# Patient Record
Sex: FEMALE | Race: WHITE | Employment: FULL TIME | URBAN - METROPOLITAN AREA
[De-identification: names, ages, dates, MRNs, and addresses within clinical notes are randomized per-mention and may not be internally consistent; named-entity substitution may affect disease eponyms.]

---

## 2017-07-28 ENCOUNTER — HOSPITAL ENCOUNTER (EMERGENCY)
Age: 27
Discharge: HOME | End: 2017-07-29
Attending: EMERGENCY MEDICINE
Payer: SELF-PAY

## 2017-07-28 DIAGNOSIS — H66.92 LEFT OTITIS MEDIA, UNSPECIFIED CHRONICITY, UNSPECIFIED OTITIS MEDIA TYPE: Primary | ICD-10-CM

## 2017-07-28 PROCEDURE — 99283 EMERGENCY DEPT VISIT LOW MDM: CPT | Performed by: EMERGENCY MEDICINE

## 2017-07-29 VITALS
WEIGHT: 180 LBS | RESPIRATION RATE: 18 BRPM | TEMPERATURE: 98.6 F | DIASTOLIC BLOOD PRESSURE: 80 MMHG | BODY MASS INDEX: 31.89 KG/M2 | HEART RATE: 85 BPM | OXYGEN SATURATION: 100 % | HEIGHT: 63 IN | SYSTOLIC BLOOD PRESSURE: 149 MMHG

## 2017-07-29 PROCEDURE — 74011250637 HC RX REV CODE- 250/637: Performed by: EMERGENCY MEDICINE

## 2017-07-29 RX ORDER — NAPROXEN 250 MG/1
500 TABLET ORAL ONCE
Status: COMPLETED | OUTPATIENT
Start: 2017-07-29 | End: 2017-07-29

## 2017-07-29 RX ORDER — AMOXICILLIN 500 MG/1
500 CAPSULE ORAL
Status: COMPLETED | OUTPATIENT
Start: 2017-07-29 | End: 2017-07-29

## 2017-07-29 RX ORDER — AMOXICILLIN 875 MG/1
875 TABLET, FILM COATED ORAL 2 TIMES DAILY
Qty: 20 TAB | Refills: 0 | Status: SHIPPED | OUTPATIENT
Start: 2017-07-29 | End: 2018-08-23

## 2017-07-29 RX ORDER — TRAMADOL HYDROCHLORIDE 50 MG/1
100 TABLET ORAL
Qty: 19 TAB | Refills: 0 | Status: SHIPPED | OUTPATIENT
Start: 2017-07-29 | End: 2018-08-23

## 2017-07-29 RX ADMIN — NAPROXEN 500 MG: 250 TABLET ORAL at 00:53

## 2017-07-29 RX ADMIN — AMOXICILLIN 500 MG: 500 CAPSULE ORAL at 00:53

## 2017-07-29 NOTE — ED PROVIDER NOTES
Patient is a 23 y.o. female presenting with ear pain. The history is provided by the patient. Ear Pain    This is a new problem. The current episode started yesterday. The problem occurs constantly. Patient complains that the left ear is affected. There has been no fever. The pain is moderate. Associated symptoms include ear discharge. Pertinent negatives include no headaches, no rhinorrhea, no sore throat, no abdominal pain, no diarrhea, no vomiting, no neck pain, no cough and no rash. Her past medical history does not include chronic ear infection or hearing loss. History reviewed. No pertinent past medical history. Past Surgical History:   Procedure Laterality Date    HX GYN               History reviewed. No pertinent family history. Social History     Social History    Marital status: SINGLE     Spouse name: N/A    Number of children: N/A    Years of education: N/A     Occupational History    Not on file. Social History Main Topics    Smoking status: Current Every Day Smoker    Smokeless tobacco: Not on file    Alcohol use No    Drug use: No    Sexual activity: Not on file     Other Topics Concern    Not on file     Social History Narrative    No narrative on file         ALLERGIES: Review of patient's allergies indicates no known allergies. Review of Systems   Constitutional: Negative for chills and fever. HENT: Positive for ear discharge and ear pain. Negative for congestion, rhinorrhea and sore throat. Eyes: Negative for photophobia and discharge. Respiratory: Negative for cough and shortness of breath. Cardiovascular: Negative for chest pain and palpitations. Gastrointestinal: Negative for abdominal pain, constipation, diarrhea and vomiting. Endocrine: Negative for cold intolerance and heat intolerance. Genitourinary: Negative for dysuria and flank pain. Musculoskeletal: Negative for arthralgias, myalgias and neck pain.    Skin: Negative for rash and wound. Allergic/Immunologic: Negative for environmental allergies and food allergies. Neurological: Negative for syncope and headaches. Hematological: Negative for adenopathy. Does not bruise/bleed easily. Psychiatric/Behavioral: Negative for dysphoric mood. The patient is not nervous/anxious. All other systems reviewed and are negative. Vitals:    07/28/17 2311   BP: 155/81   Pulse: 90   Resp: 16   Temp: 99.4 °F (37.4 °C)   SpO2: 100%   Weight: 81.6 kg (180 lb)   Height: 5' 3\" (1.6 m)            Physical Exam   Constitutional: She is oriented to person, place, and time. She appears well-developed and well-nourished. She appears distressed. HENT:   Head: Normocephalic and atraumatic. Right Ear: Hearing normal.   Left Ear: Hearing and external ear normal. Tympanic membrane is injected and bulging. Mouth/Throat: Oropharynx is clear and moist.   Eyes: Conjunctivae and EOM are normal. Pupils are equal, round, and reactive to light. Right eye exhibits no discharge. Left eye exhibits no discharge. No scleral icterus. Neck: Normal range of motion. Neck supple. No thyromegaly present. Cardiovascular: Normal rate, regular rhythm, normal heart sounds and intact distal pulses. Exam reveals no gallop and no friction rub. No murmur heard. Pulmonary/Chest: Effort normal and breath sounds normal. No respiratory distress. She has no wheezes. She exhibits no tenderness. Abdominal: Soft. Bowel sounds are normal. She exhibits no distension. There is no hepatosplenomegaly. There is no tenderness. There is no rebound and no guarding. No hernia. Musculoskeletal: Normal range of motion. She exhibits no edema or tenderness. Neurological: She is alert and oriented to person, place, and time. No cranial nerve deficit. She exhibits normal muscle tone. Skin: Skin is warm. No rash noted. She is not diaphoretic. No erythema. Psychiatric: She has a normal mood and affect.  Her behavior is normal. Judgment and thought content normal.   Nursing note and vitals reviewed. MDM  Number of Diagnoses or Management Options  Left otitis media, unspecified chronicity, unspecified otitis media type: new and does not require workup  Diagnosis management comments: Otalgia versus infection versus perforation       Amount and/or Complexity of Data Reviewed  Tests in the medicine section of CPT®: ordered and reviewed  Review and summarize past medical records: yes    Risk of Complications, Morbidity, and/or Mortality  Presenting problems: moderate  Diagnostic procedures: low  Management options: moderate  General comments: Elements of this note have been dictated via voice recognition software. Text and phrases may be limited by the accuracy of the software. The chart has been reviewed, but errors may still be present.       Patient Progress  Patient progress: improved    ED Course       Procedures

## 2017-07-29 NOTE — DISCHARGE INSTRUCTIONS
Ear Infection (Otitis Media): Care Instructions  Your Care Instructions    An ear infection may start with a cold and affect the middle ear (otitis media). It can hurt a lot. Most ear infections clear up on their own in a couple of days. Most often you will not need antibiotics. This is because many ear infections are caused by a virus. Antibiotics don't work against a virus. Regular doses of pain medicines are the best way to reduce your fever and help you feel better. Follow-up care is a key part of your treatment and safety. Be sure to make and go to all appointments, and call your doctor if you are having problems. It's also a good idea to know your test results and keep a list of the medicines you take. How can you care for yourself at home? · Take pain medicines exactly as directed. ¨ If the doctor gave you a prescription medicine for pain, take it as prescribed. ¨ If you are not taking a prescription pain medicine, take an over-the-counter medicine, such as acetaminophen (Tylenol), ibuprofen (Advil, Motrin), or naproxen (Aleve). Read and follow all instructions on the label. ¨ Do not take two or more pain medicines at the same time unless the doctor told you to. Many pain medicines have acetaminophen, which is Tylenol. Too much acetaminophen (Tylenol) can be harmful. · Plan to take a full dose of pain reliever before bedtime. Getting enough sleep will help you get better. · Try a warm, moist washcloth on the ear. It may help relieve pain. · If your doctor prescribed antibiotics, take them as directed. Do not stop taking them just because you feel better. You need to take the full course of antibiotics. When should you call for help? Call your doctor now or seek immediate medical care if:  · You have new or increasing ear pain. · You have new or increasing pus or blood draining from your ear. · You have a fever with a stiff neck or a severe headache.   Watch closely for changes in your health, and be sure to contact your doctor if:  · You have new or worse symptoms. · You are not getting better after taking an antibiotic for 2 days. Where can you learn more? Go to http://gloria-randee.info/. Enter B512 in the search box to learn more about \"Ear Infection (Otitis Media): Care Instructions. \"  Current as of: May 4, 2017  Content Version: 11.3  © 5235-4444 [employer]. Care instructions adapted under license by [employer] (which disclaims liability or warranty for this information). If you have questions about a medical condition or this instruction, always ask your healthcare professional. [address] any warranty or liability for your use of this information.

## 2017-07-29 NOTE — ED TRIAGE NOTES
Pt arrives complaining of left ear pain. Pt states she went swimming last weekend and states she feels like theres something in her ear. Pt states liquid came out of it today. Pt alert and oriented in triage.

## 2018-08-23 ENCOUNTER — HOSPITAL ENCOUNTER (EMERGENCY)
Age: 28
Discharge: HOME | End: 2018-08-23
Attending: EMERGENCY MEDICINE
Payer: SELF-PAY

## 2018-08-23 VITALS
DIASTOLIC BLOOD PRESSURE: 65 MMHG | RESPIRATION RATE: 18 BRPM | WEIGHT: 172 LBS | OXYGEN SATURATION: 95 % | SYSTOLIC BLOOD PRESSURE: 145 MMHG | HEIGHT: 62 IN | TEMPERATURE: 98.1 F | HEART RATE: 95 BPM | BODY MASS INDEX: 31.65 KG/M2

## 2018-08-23 DIAGNOSIS — N30.01 ACUTE CYSTITIS WITH HEMATURIA: Primary | ICD-10-CM

## 2018-08-23 LAB
ALBUMIN SERPL-MCNC: 3.7 G/DL (ref 3.5–5)
ALBUMIN/GLOB SERPL: 1 {RATIO} (ref 1.2–3.5)
ALP SERPL-CCNC: 96 U/L (ref 50–136)
ALT SERPL-CCNC: 19 U/L (ref 12–65)
ANION GAP SERPL CALC-SCNC: 13 MMOL/L (ref 7–16)
AST SERPL-CCNC: 20 U/L (ref 15–37)
BACTERIA URNS QL MICRO: ABNORMAL /HPF
BASOPHILS # BLD: 0.1 K/UL (ref 0–0.2)
BASOPHILS NFR BLD: 1 % (ref 0–2)
BILIRUB SERPL-MCNC: 0.3 MG/DL (ref 0.2–1.1)
BUN SERPL-MCNC: 15 MG/DL (ref 6–23)
CALCIUM SERPL-MCNC: 9 MG/DL (ref 8.3–10.4)
CASTS URNS QL MICRO: 0 /LPF
CHLORIDE SERPL-SCNC: 107 MMOL/L (ref 98–107)
CO2 SERPL-SCNC: 22 MMOL/L (ref 21–32)
CREAT SERPL-MCNC: 0.79 MG/DL (ref 0.6–1)
CRYSTALS URNS QL MICRO: ABNORMAL /LPF
DIFFERENTIAL METHOD BLD: ABNORMAL
EOSINOPHIL # BLD: 0.7 K/UL (ref 0–0.8)
EOSINOPHIL NFR BLD: 4 % (ref 0.5–7.8)
EPI CELLS #/AREA URNS HPF: ABNORMAL /HPF
ERYTHROCYTE [DISTWIDTH] IN BLOOD BY AUTOMATED COUNT: 13.8 %
GLOBULIN SER CALC-MCNC: 3.7 G/DL (ref 2.3–3.5)
GLUCOSE SERPL-MCNC: 91 MG/DL (ref 65–100)
HCG UR QL: NEGATIVE
HCT VFR BLD AUTO: 39.2 % (ref 35.8–46.3)
HGB BLD-MCNC: 12.7 G/DL (ref 11.7–15.4)
IMM GRANULOCYTES # BLD: 0.1 K/UL (ref 0–0.5)
IMM GRANULOCYTES NFR BLD AUTO: 0 % (ref 0–5)
LYMPHOCYTES # BLD: 6.5 K/UL (ref 0.5–4.6)
LYMPHOCYTES NFR BLD: 41 % (ref 13–44)
MCH RBC QN AUTO: 29 PG (ref 26.1–32.9)
MCHC RBC AUTO-ENTMCNC: 32.4 G/DL (ref 31.4–35)
MCV RBC AUTO: 89.5 FL (ref 79.6–97.8)
MONOCYTES # BLD: 0.9 K/UL (ref 0.1–1.3)
MONOCYTES NFR BLD: 6 % (ref 4–12)
MUCOUS THREADS URNS QL MICRO: 0 /LPF
NEUTS SEG # BLD: 7.5 K/UL (ref 1.7–8.2)
NEUTS SEG NFR BLD: 47 % (ref 43–78)
NRBC # BLD: 0 K/UL (ref 0–0.2)
OTHER OBSERVATIONS,UCOM: ABNORMAL
PLATELET # BLD AUTO: 350 K/UL (ref 150–450)
PMV BLD AUTO: 9.6 FL (ref 9.4–12.3)
POTASSIUM SERPL-SCNC: 4.2 MMOL/L (ref 3.5–5.1)
PROT SERPL-MCNC: 7.4 G/DL (ref 6.3–8.2)
RBC # BLD AUTO: 4.38 M/UL (ref 4.05–5.2)
RBC #/AREA URNS HPF: ABNORMAL /HPF
SODIUM SERPL-SCNC: 142 MMOL/L (ref 136–145)
WBC # BLD AUTO: 15.8 K/UL (ref 4.3–11.1)
WBC URNS QL MICRO: ABNORMAL /HPF

## 2018-08-23 PROCEDURE — 80053 COMPREHEN METABOLIC PANEL: CPT

## 2018-08-23 PROCEDURE — 81003 URINALYSIS AUTO W/O SCOPE: CPT | Performed by: EMERGENCY MEDICINE

## 2018-08-23 PROCEDURE — 81015 MICROSCOPIC EXAM OF URINE: CPT

## 2018-08-23 PROCEDURE — 85025 COMPLETE CBC W/AUTO DIFF WBC: CPT

## 2018-08-23 PROCEDURE — 99284 EMERGENCY DEPT VISIT MOD MDM: CPT | Performed by: EMERGENCY MEDICINE

## 2018-08-23 PROCEDURE — 81025 URINE PREGNANCY TEST: CPT

## 2018-08-23 RX ORDER — NITROFURANTOIN 25; 75 MG/1; MG/1
100 CAPSULE ORAL 2 TIMES DAILY
Qty: 6 CAP | Refills: 0 | Status: SHIPPED | OUTPATIENT
Start: 2018-08-23 | End: 2018-08-26

## 2018-08-23 RX ORDER — PHENAZOPYRIDINE HYDROCHLORIDE 200 MG/1
200 TABLET, FILM COATED ORAL 3 TIMES DAILY
Qty: 6 TAB | Refills: 0 | Status: SHIPPED | OUTPATIENT
Start: 2018-08-23 | End: 2018-08-25

## 2018-08-23 NOTE — DISCHARGE INSTRUCTIONS
Urinary Tract Infection in Women: Care Instructions  Your Care Instructions    A urinary tract infection, or UTI, is a general term for an infection anywhere between the kidneys and the urethra (where urine comes out). Most UTIs are bladder infections. They often cause pain or burning when you urinate. UTIs are caused by bacteria and can be cured with antibiotics. Be sure to complete your treatment so that the infection goes away. Follow-up care is a key part of your treatment and safety. Be sure to make and go to all appointments, and call your doctor if you are having problems. It's also a good idea to know your test results and keep a list of the medicines you take. How can you care for yourself at home? · Take your antibiotics as directed. Do not stop taking them just because you feel better. You need to take the full course of antibiotics. · Drink extra water and other fluids for the next day or two. This may help wash out the bacteria that are causing the infection. (If you have kidney, heart, or liver disease and have to limit fluids, talk with your doctor before you increase your fluid intake.)  · Avoid drinks that are carbonated or have caffeine. They can irritate the bladder. · Urinate often. Try to empty your bladder each time. · To relieve pain, take a hot bath or lay a heating pad set on low over your lower belly or genital area. Never go to sleep with a heating pad in place. To prevent UTIs  · Drink plenty of water each day. This helps you urinate often, which clears bacteria from your system. (If you have kidney, heart, or liver disease and have to limit fluids, talk with your doctor before you increase your fluid intake.)  · Urinate when you need to. · Urinate right after you have sex. · Change sanitary pads often. · Avoid douches, bubble baths, feminine hygiene sprays, and other feminine hygiene products that have deodorants.   · After going to the bathroom, wipe from front to back.  When should you call for help? Call your doctor now or seek immediate medical care if:    · Symptoms such as fever, chills, nausea, or vomiting get worse or appear for the first time.     · You have new pain in your back just below your rib cage. This is called flank pain.     · There is new blood or pus in your urine.     · You have any problems with your antibiotic medicine.    Watch closely for changes in your health, and be sure to contact your doctor if:    · You are not getting better after taking an antibiotic for 2 days.     · Your symptoms go away but then come back. Where can you learn more? Go to http://gloria-randee.info/. Enter F355 in the search box to learn more about \"Urinary Tract Infection in Women: Care Instructions. \"  Current as of: May 12, 2017  Content Version: 11.7  © 5519-6233 [employer]. Care instructions adapted under license by [employer] (which disclaims liability or warranty for this information). If you have questions about a medical condition or this instruction, always ask your healthcare professional. [address] any warranty or liability for your use of this information.

## 2018-08-23 NOTE — ED TRIAGE NOTES
Pt arrives to ED via POV. States she is having pain with urination with some hematuria. C/o lower abd pain with diarrhea, denies n/v. Denies any burning sensation when urinating. States she has known hx kidney stones but denies having any flank pain at this time.

## 2018-08-23 NOTE — ED PROVIDER NOTES
HPI Comments: Presents with complaint of suprapubic cramping  And painful urination for 1 day. She states no back pain no flank pain no fever no vaginal discharge no nausea or vomiting. She's never had a UTI before. Patient is a 89 [place] y.o. female presenting with hematuria. The history is provided by the patient. Blood in Urine    This is a new problem. The current episode started yesterday. The problem occurs every urination. The problem has not changed since onset. The quality of the pain is described as burning. The pain is mild. There has been no fever. Associated symptoms include hematuria, urgency and abdominal pain (suprapubic occasional). Pertinent negatives include no chills, no sweats, no nausea, no vomiting, no frequency, no hesitancy, no flank pain and no back pain. The patient is not pregnant. She has tried nothing for the symptoms. No past medical history on file. Past Surgical History:   Procedure Laterality Date    HX GYN               No family history on file. Social History     Social History    Marital status: SINGLE     Spouse name: N/A    Number of children: N/A    Years of education: N/A     Occupational History    Not on file. Social History Main Topics    Smoking status: Current Every Day Smoker    Smokeless tobacco: Not on file    Alcohol use No    Drug use: No    Sexual activity: Not on file     Other Topics Concern    Not on file     Social History Narrative    No narrative on file         ALLERGIES: Review of patient's allergies indicates no known allergies. Review of Systems   Constitutional: Negative for chills and fever. Gastrointestinal: Positive for abdominal pain (suprapubic occasional). Negative for nausea and vomiting. Genitourinary: Positive for hematuria and urgency. Negative for flank pain, frequency and hesitancy. Musculoskeletal: Negative for back pain. All other systems reviewed and are negative.       Vitals:    18 0101 08/23/18 0507   BP: 137/86 145/65   Pulse: 93 95   Resp: 18 18   Temp: 98.3 °F (36.8 °C) 98.1 °F (36.7 °C)   SpO2: 92% 95%   Weight: 78 kg (172 lb)    Height: 5' 2\" (1.575 m)             Physical Exam   Constitutional: She is oriented to person, place, and time. She appears well-developed and well-nourished. HENT:   Head: Normocephalic and atraumatic. Neck: Normal range of motion. Neck supple. Abdominal: Soft. She exhibits no distension. There is no tenderness. There is no rebound and no guarding. Musculoskeletal: Normal range of motion. She exhibits no edema or tenderness. Neurological: She is alert and oriented to person, place, and time. No cranial nerve deficit. Skin: Skin is warm and dry. No rash noted. No erythema. Nursing note and vitals reviewed. MDM  Number of Diagnoses or Management Options  Acute cystitis with hematuria:   Diagnosis management comments: Patient with findings consistent with UTI and has no flank or back pain and no nausea or vomiting that would indicate a kidney stone.   Discharge home with Macrobid and Pyridium       Amount and/or Complexity of Data Reviewed  Clinical lab tests: reviewed and ordered    Risk of Complications, Morbidity, and/or Mortality  Presenting problems: high  Diagnostic procedures: moderate  Management options: moderate    Patient Progress  Patient progress: improved        ED Course       Procedures

## 2018-08-23 NOTE — ED NOTES
I have reviewed discharge instructions with the patient. The patient verbalized understanding. Patient left ED via Discharge Method: ambulatory to Home with self. Opportunity for questions and clarification provided. Patient given 2 scripts. To continue your aftercare when you leave the hospital, you may receive an automated call from our care team to check in on how you are doing. This is a free service and part of our promise to provide the best care and service to meet your aftercare needs.  If you have questions, or wish to unsubscribe from this service please call [phone]. Thank you for Choosing our [facility].